# Patient Record
(demographics unavailable — no encounter records)

---

## 2024-10-21 NOTE — PHYSICAL EXAM
[Person] : oriented to person [Place] : oriented to place [Time] : oriented to time [Short Term Intact] : short term memory intact [Naming Objects] : no difficulty naming common objects [Fluency] : fluency intact [Reading] : reading intact [Current Events] : adequate knowledge of current events [Past History] : adequate knowledge of personal past history [Cranial Nerves Optic (II)] : visual acuity intact bilaterally,  visual fields full to confrontation, pupils equal round and reactive to light [Cranial Nerves Oculomotor (III)] : extraocular motion intact [Cranial Nerves Trigeminal (V)] : facial sensation intact symmetrically [Cranial Nerves Facial (VII)] : face symmetrical [Cranial Nerves Vestibulocochlear (VIII)] : hearing was intact bilaterally [Cranial Nerves Glossopharyngeal (IX)] : tongue and palate midline [Cranial Nerves Accessory (XI - Cranial And Spinal)] : head turning and shoulder shrug symmetric [Cranial Nerves Hypoglossal (XII)] : there was no tongue deviation with protrusion [Motor Tone] : muscle tone was normal in all four extremities [Motor Strength] : muscle strength was normal in all four extremities [Motor Handedness Right-Handed] : the patient is right hand dominant [Vibration Decrease Leg / Foot Right Toes] : decreased at the toes of the right foot [Vibration Decrease Leg / Foot Left Toes] : normal  at the toes of the left foot [Abnormal Walk] : normal gait [Past-pointing] : there was no past-pointing [Tremor] : no tremor present [2+] : Ankle jerk left 2+ [Plantar Reflex Right Only] : normal on the right [Plantar Reflex Left Only] : normal on the left [FreeTextEntry8] : minimal sway

## 2024-10-21 NOTE — ASSESSMENT
[FreeTextEntry1] : 67 year old female with PMHx of chronic neck pain s/p fusion, back pain, Pre-Dm presents today for follow up. EMG reported normal. will continue to monitor.  Diet modifications, exercise    f/u prn

## 2024-10-21 NOTE — HISTORY OF PRESENT ILLNESS
[FreeTextEntry1] : 67 year old female with PMHx of chronic neck pain s/p fusion, back pain, PreDm presents today with complaints of right great toe numbness  that has been occurring for past yr, was having tingling  year or so prior. had bunion surgery 10-15yrs ago with screw in toe. past months has been having radiating pain to BLE. no  recent trauma, denies any weakness, bladder or bowel changes.   **Interval 10.21.24: EMG reported normal. She reports no changes in her symptoms they are infrequent and randomly triggered.  She does not want to further treat with any medications at this time and will monitor